# Patient Record
Sex: FEMALE | Race: WHITE | Employment: OTHER | URBAN - METROPOLITAN AREA
[De-identification: names, ages, dates, MRNs, and addresses within clinical notes are randomized per-mention and may not be internally consistent; named-entity substitution may affect disease eponyms.]

---

## 2023-08-08 ENCOUNTER — HOSPITAL ENCOUNTER (EMERGENCY)
Age: 18
Discharge: HOME | End: 2023-08-08

## 2023-08-08 VITALS
WEIGHT: 148.37 LBS | TEMPERATURE: 98.1 F | SYSTOLIC BLOOD PRESSURE: 137 MMHG | OXYGEN SATURATION: 99 % | HEIGHT: 65 IN | DIASTOLIC BLOOD PRESSURE: 78 MMHG | BODY MASS INDEX: 24.72 KG/M2 | HEART RATE: 87 BPM | RESPIRATION RATE: 16 BRPM

## 2023-08-08 DIAGNOSIS — N89.8 VAGINAL ODOR: Primary | ICD-10-CM

## 2023-08-08 DIAGNOSIS — N89.8 VAGINAL DISCHARGE: ICD-10-CM

## 2023-08-08 LAB
BACTERIA GENITAL QL WET PREP: NORMAL
BACTERIA URNS QL MICRO: ABNORMAL /HPF
BILIRUB UR QL STRIP.AUTO: NEGATIVE
CLARITY UR: CLEAR
CLUE CELLS SPEC QL WET PREP: NORMAL
COLOR UR: YELLOW
EPI CELLS #/AREA URNS AUTO: 13 /HPF (ref 0–5)
EPI CELLS SPEC QL WET PREP: NORMAL
GLUCOSE UR STRIP.AUTO-MCNC: NEGATIVE MG/DL
HCG UR QL: NEGATIVE
HGB UR QL STRIP.AUTO: NEGATIVE
HYALINE CASTS #/AREA URNS AUTO: 0 /LPF (ref 0–8)
KETONES UR STRIP.AUTO-MCNC: NEGATIVE MG/DL
LEUKOCYTE ESTERASE UR QL STRIP.AUTO: ABNORMAL
MUCUS: PRESENT
NITRITE UR QL STRIP.AUTO: NEGATIVE
PH UR STRIP.AUTO: 5.5 [PH] (ref 5–8)
PROT UR STRIP.AUTO-MCNC: NEGATIVE MG/DL
RBC CLUMPS #/AREA URNS AUTO: 1 /HPF (ref 0–4)
RBC SPEC QL WET PREP: NORMAL
SP GR UR STRIP.AUTO: 1.02 (ref 1–1.03)
SPECIMEN SOURCE FLD: NORMAL
T VAGINALIS GENITAL QL WET PREP: NORMAL
UA COMPLETE W REFLEX CULTURE PNL UR: ABNORMAL
UA DIPSTICK W REFLEX MICRO PNL UR: YES
URN SPEC COLLECT METH UR: ABNORMAL
UROBILINOGEN UR STRIP-ACNC: 1 E.U./DL
WBC #/AREA URNS AUTO: 5 /HPF (ref 0–5)
WBC SPEC QL WET PREP: NORMAL
YEAST GENITAL QL WET PREP: NORMAL

## 2023-08-08 PROCEDURE — 87210 SMEAR WET MOUNT SALINE/INK: CPT

## 2023-08-08 PROCEDURE — 87591 N.GONORRHOEAE DNA AMP PROB: CPT

## 2023-08-08 PROCEDURE — 99283 EMERGENCY DEPT VISIT LOW MDM: CPT

## 2023-08-08 PROCEDURE — 84703 CHORIONIC GONADOTROPIN ASSAY: CPT

## 2023-08-08 PROCEDURE — 81001 URINALYSIS AUTO W/SCOPE: CPT

## 2023-08-08 PROCEDURE — 87491 CHLMYD TRACH DNA AMP PROBE: CPT

## 2023-08-08 ASSESSMENT — PAIN - FUNCTIONAL ASSESSMENT: PAIN_FUNCTIONAL_ASSESSMENT: NONE - DENIES PAIN

## 2023-08-08 ASSESSMENT — LIFESTYLE VARIABLES
HOW OFTEN DO YOU HAVE A DRINK CONTAINING ALCOHOL: 2-4 TIMES A MONTH
HOW MANY STANDARD DRINKS CONTAINING ALCOHOL DO YOU HAVE ON A TYPICAL DAY: 1 OR 2

## 2023-08-08 NOTE — DISCHARGE INSTRUCTIONS
Your pregnancy test is negative as you would expect, your urinalysis is also negative for evidence of infection. The specimen actually has some contamination from skin cells. This does not require antibiotic treatment. Your wet prep results is negative for trichomonas, negative for evidence of yeast or negative for evidence of bacterial vaginosis. Therefore no medications are required    Your gonorrhea and Chlamydia testing results will not be available for another 24 to 48 hours. Monitor MyChart. If your results are positive for either or both you will be given a phone call, instructed to return for treatment. I would avoid douching, tampons, any further sexual intercourse, give yourself a pelvic rest for 1 to 2 weeks.

## 2023-08-08 NOTE — ED PROVIDER NOTES
[address] [facility]  eMERGENCY dEPARTMENT eNCOUnter    Pt Name: @@  MRN: [id number]  Birthdate2005  Date of evaluation: 8/8/2023  Provider: ROSA Lopez CNP      Independently seen and evaluated by the advanced practice provider  [address]       Chief Complaint   Patient presents with    Vaginal Discharge     Reports yellow discharge and odor coming from vagina. Concerned for sti. Denies pelvic pain. HISTORY OF PRESENT ILLNESS  (Location/Symptom, Timing/Onset, Context/Setting, Quality, Duration, Modifying Factors, Severity.)   Kelli Glass is a 19 y.o. female who presents to the emergency department PMHx: None reported except appendectomy and tonsillectomy    Lives at home  Social determinants none identified  CODE STATUS: Full  Anticoagulation therapy: None    HPI provided by the patient    Patient presents ambulatory via personal vehicle to the emergency department concerned about a vaginal odor and discharge different from her norm. Patient brought her toddler sister with her. LMP: Unknown-> uses Depo-Provera injections for birth control  G0, P0 AB 0  Sexually active-> uses no protection  Has never had a pelvic exam, has no GYN      Denies abdominal pain, dysuria hematuria. Denies fever or chills. Denies back pain. Nursing Notes were reviewed and I agree. REVIEW OF SYSTEMS    (2-9 systems for level 4, 10 or more for level 5)     Review of Systems   Genitourinary:  Positive for vaginal discharge. Negative for decreased urine volume, difficulty urinating, dyspareunia, dysuria, enuresis, flank pain, frequency, genital sores, hematuria, menstrual problem, pelvic pain, urgency, vaginal bleeding and vaginal pain. All other systems reviewed and are negative. Except as noted above the remainder of the review of systems was reviewed and negative. PAST MEDICAL HISTORY   No past medical history on file.     SURGICAL HISTORY

## 2023-08-09 LAB
C TRACH DNA CVX QL NAA+PROBE: NEGATIVE
N GONORRHOEA DNA CERV MUCUS QL NAA+PROBE: NEGATIVE